# Patient Record
Sex: MALE | Race: BLACK OR AFRICAN AMERICAN | NOT HISPANIC OR LATINO | RURAL
[De-identification: names, ages, dates, MRNs, and addresses within clinical notes are randomized per-mention and may not be internally consistent; named-entity substitution may affect disease eponyms.]

---

## 2022-07-23 ENCOUNTER — HOSPITAL ENCOUNTER (EMERGENCY)
Facility: HOSPITAL | Age: 27
Discharge: HOME | End: 2022-07-23

## 2022-07-23 VITALS
SYSTOLIC BLOOD PRESSURE: 111 MMHG | TEMPERATURE: 98 F | WEIGHT: 170 LBS | OXYGEN SATURATION: 99 % | HEART RATE: 55 BPM | BODY MASS INDEX: 27.32 KG/M2 | HEIGHT: 66 IN | RESPIRATION RATE: 16 BRPM | DIASTOLIC BLOOD PRESSURE: 72 MMHG

## 2022-07-23 DIAGNOSIS — K52.9 GASTROENTERITIS: Primary | ICD-10-CM

## 2022-07-23 LAB
AMYLASE SERPL-CCNC: 52 U/L (ref 25–115)
ANION GAP SERPL CALCULATED.3IONS-SCNC: 18 MMOL/L (ref 7–16)
BASOPHILS # BLD AUTO: 0.02 K/UL (ref 0–0.2)
BASOPHILS NFR BLD AUTO: 0.4 % (ref 0–1)
BUN SERPL-MCNC: 16 MG/DL (ref 7–18)
BUN/CREAT SERPL: 16 (ref 6–20)
CALCIUM SERPL-MCNC: 9.3 MG/DL (ref 8.5–10.1)
CHLORIDE SERPL-SCNC: 102 MMOL/L (ref 98–107)
CO2 SERPL-SCNC: 27 MMOL/L (ref 21–32)
CREAT SERPL-MCNC: 0.97 MG/DL (ref 0.7–1.3)
DIFFERENTIAL METHOD BLD: ABNORMAL
EOSINOPHIL # BLD AUTO: 0.07 K/UL (ref 0–0.5)
EOSINOPHIL NFR BLD AUTO: 1.4 % (ref 1–4)
ERYTHROCYTE [DISTWIDTH] IN BLOOD BY AUTOMATED COUNT: 13.1 % (ref 11.5–14.5)
GLUCOSE SERPL-MCNC: 122 MG/DL (ref 74–106)
HCT VFR BLD AUTO: 49.1 % (ref 40–54)
HGB BLD-MCNC: 17.7 G/DL (ref 13.5–18)
LIPASE SERPL-CCNC: 60 U/L (ref 73–393)
LYMPHOCYTES # BLD AUTO: 1.26 K/UL (ref 1–4.8)
LYMPHOCYTES NFR BLD AUTO: 24.5 % (ref 27–41)
MCH RBC QN AUTO: 30.6 PG (ref 27–31)
MCHC RBC AUTO-ENTMCNC: 36 G/DL (ref 32–36)
MCV RBC AUTO: 84.9 FL (ref 80–96)
MONOCYTES # BLD AUTO: 0.33 K/UL (ref 0–0.8)
MONOCYTES NFR BLD AUTO: 6.4 % (ref 2–6)
MPC BLD CALC-MCNC: 11 FL (ref 9.4–12.4)
NEUTROPHILS # BLD AUTO: 3.46 K/UL (ref 1.8–7.7)
NEUTROPHILS NFR BLD AUTO: 67.3 % (ref 53–65)
PLATELET # BLD AUTO: 172 K/UL (ref 150–400)
POTASSIUM SERPL-SCNC: 3.6 MMOL/L (ref 3.5–5.1)
RBC # BLD AUTO: 5.78 M/UL (ref 4.6–6.2)
SODIUM SERPL-SCNC: 143 MMOL/L (ref 136–145)
WBC # BLD AUTO: 5.14 K/UL (ref 4.5–11)

## 2022-07-23 PROCEDURE — 25500020 PHARM REV CODE 255: Performed by: NURSE PRACTITIONER

## 2022-07-23 PROCEDURE — 82150 ASSAY OF AMYLASE: CPT | Performed by: NURSE PRACTITIONER

## 2022-07-23 PROCEDURE — 63600175 PHARM REV CODE 636 W HCPCS: Performed by: NURSE PRACTITIONER

## 2022-07-23 PROCEDURE — 80048 BASIC METABOLIC PNL TOTAL CA: CPT | Performed by: NURSE PRACTITIONER

## 2022-07-23 PROCEDURE — 99284 PR EMERGENCY DEPT VISIT,LEVEL IV: ICD-10-PCS | Mod: ,,, | Performed by: NURSE PRACTITIONER

## 2022-07-23 PROCEDURE — 99284 EMERGENCY DEPT VISIT MOD MDM: CPT | Mod: ,,, | Performed by: NURSE PRACTITIONER

## 2022-07-23 PROCEDURE — 36415 COLL VENOUS BLD VENIPUNCTURE: CPT | Performed by: NURSE PRACTITIONER

## 2022-07-23 PROCEDURE — 83690 ASSAY OF LIPASE: CPT | Performed by: NURSE PRACTITIONER

## 2022-07-23 PROCEDURE — 25000003 PHARM REV CODE 250: Performed by: NURSE PRACTITIONER

## 2022-07-23 PROCEDURE — 96375 TX/PRO/DX INJ NEW DRUG ADDON: CPT

## 2022-07-23 PROCEDURE — 85025 COMPLETE CBC W/AUTO DIFF WBC: CPT | Performed by: NURSE PRACTITIONER

## 2022-07-23 PROCEDURE — 99285 EMERGENCY DEPT VISIT HI MDM: CPT | Mod: 25

## 2022-07-23 PROCEDURE — 96365 THER/PROPH/DIAG IV INF INIT: CPT

## 2022-07-23 RX ORDER — PROMETHAZINE HYDROCHLORIDE 25 MG/1
25 TABLET ORAL EVERY 6 HOURS PRN
Qty: 15 TABLET | Refills: 0 | Status: SHIPPED | OUTPATIENT
Start: 2022-07-23

## 2022-07-23 RX ORDER — MORPHINE SULFATE 2 MG/ML
4 INJECTION, SOLUTION INTRAMUSCULAR; INTRAVENOUS
Status: COMPLETED | OUTPATIENT
Start: 2022-07-23 | End: 2022-07-23

## 2022-07-23 RX ORDER — ONDANSETRON 2 MG/ML
4 INJECTION INTRAMUSCULAR; INTRAVENOUS
Status: COMPLETED | OUTPATIENT
Start: 2022-07-23 | End: 2022-07-23

## 2022-07-23 RX ORDER — KETOROLAC TROMETHAMINE 30 MG/ML
30 INJECTION, SOLUTION INTRAMUSCULAR; INTRAVENOUS
Status: COMPLETED | OUTPATIENT
Start: 2022-07-23 | End: 2022-07-23

## 2022-07-23 RX ORDER — CIPROFLOXACIN 500 MG/1
500 TABLET ORAL 2 TIMES DAILY
Qty: 20 TABLET | Refills: 0 | Status: SHIPPED | OUTPATIENT
Start: 2022-07-23 | End: 2022-07-30

## 2022-07-23 RX ORDER — SODIUM CHLORIDE 9 MG/ML
1000 INJECTION, SOLUTION INTRAVENOUS
Status: COMPLETED | OUTPATIENT
Start: 2022-07-23 | End: 2022-07-23

## 2022-07-23 RX ORDER — METRONIDAZOLE 500 MG/1
500 TABLET ORAL 3 TIMES DAILY
Qty: 21 TABLET | Refills: 0 | Status: SHIPPED | OUTPATIENT
Start: 2022-07-23 | End: 2022-07-30

## 2022-07-23 RX ADMIN — MORPHINE SULFATE 4 MG: 2 INJECTION, SOLUTION INTRAMUSCULAR; INTRAVENOUS at 04:07

## 2022-07-23 RX ADMIN — IOPAMIDOL 100 ML: 755 INJECTION, SOLUTION INTRAVENOUS at 02:07

## 2022-07-23 RX ADMIN — MORPHINE SULFATE 4 MG: 2 INJECTION, SOLUTION INTRAMUSCULAR; INTRAVENOUS at 01:07

## 2022-07-23 RX ADMIN — SODIUM CHLORIDE 1000 ML: 9 INJECTION, SOLUTION INTRAVENOUS at 01:07

## 2022-07-23 RX ADMIN — PROMETHAZINE HYDROCHLORIDE 25 MG: 25 INJECTION INTRAMUSCULAR; INTRAVENOUS at 01:07

## 2022-07-23 RX ADMIN — ONDANSETRON 4 MG: 2 INJECTION INTRAMUSCULAR; INTRAVENOUS at 03:07

## 2022-07-23 RX ADMIN — KETOROLAC TROMETHAMINE 30 MG: 30 INJECTION, SOLUTION INTRAMUSCULAR at 03:07

## 2022-07-23 NOTE — ED TRIAGE NOTES
C/o n/v/abd pain since 0900 this am. States has vomited x 15 pta. Is heaving and vomiting yellow bile at triage. Can not be still. Denies any diarrhea and denies any other s/s.

## 2022-07-23 NOTE — DISCHARGE INSTRUCTIONS
Rest gut today. Slowly progress diet. Drink plenty of fluids. Take medication as prescribed. Follow up with PCP.

## 2022-07-23 NOTE — ED NOTES
Patient now states that he has had this issue several times before and is from [place] and has gone to several ER's and they cant tell him what is wrong they just give him pain meds, meds for n/v and send him home. This episode just started this am around 9. States ate [employer] last night but has not eaten today.

## 2022-07-23 NOTE — ED PROVIDER NOTES
Encounter Date: 7/23/2022       History     Chief Complaint   Patient presents with    Abdominal Pain    Vomiting     28 y/o BM presents to the ED with complaints of rapid onset of left lower quadrant pain with nausea and vomiting that began at 9 am. Pt reports he has vomited 15 times since 9am. Emesis is yellow and appears to be bile. Pt has no known PMH. Last ate last night. Last BM was this morning and he reports it was normal in amount, shape, color, and consistency.     Pt reports he has had this happen before. States he has been seen multiple times in hospitals in [place] and has been seen by GI but they could not find anything wrong. He states he does smoke marijuana and last used 2 days ago.         Review of patient's allergies indicates:  No Known Allergies  Past Medical History:   Diagnosis Date    Asthma      Past Surgical History:   Procedure Laterality Date    ADENOIDECTOMY      TONSILLECTOMY       History reviewed. No pertinent family history.  Social History     Tobacco Use    Smoking status: Current Every Day Smoker     Types: Cigarettes    Smokeless tobacco: Never Used   Substance Use Topics    Drug use: Yes     Types: Marijuana     Comment: 2 days ago     Review of Systems   Constitutional: Negative.    HENT: Negative.    Eyes: Negative.    Respiratory: Negative.  Negative for shortness of breath.    Cardiovascular: Negative.  Negative for chest pain, palpitations and leg swelling.   Gastrointestinal: Positive for abdominal pain, nausea and vomiting. Negative for diarrhea.   Endocrine: Negative.    Genitourinary: Negative.    Musculoskeletal: Negative.  Negative for arthralgias, back pain and neck pain.   Skin: Negative.    Neurological: Negative.    Psychiatric/Behavioral: Negative.    All other systems reviewed and are negative.      Physical Exam     Initial Vitals   BP Pulse Resp Temp SpO2   07/23/22 1336 07/23/22 1336 07/23/22 1336 07/23/22 1438 07/23/22 1336   (!) 122/94 62 20 97.8  °F (36.6 °C) 99 %      MAP       --                Physical Exam    Nursing note and vitals reviewed.  Constitutional: He appears well-developed and well-nourished.   HENT:   Head: Normocephalic and atraumatic.   Eyes: Conjunctivae and EOM are normal. Pupils are equal, round, and reactive to light.   Neck: Neck supple.   Normal range of motion.  Cardiovascular: Normal rate and normal heart sounds.   Pulmonary/Chest: Breath sounds normal.   Abdominal: Abdomen is soft. Bowel sounds are normal. There is abdominal tenderness (LLQ). There is guarding.   Musculoskeletal:         General: No tenderness or edema. Normal range of motion.      Cervical back: Normal range of motion and neck supple.     Neurological: He is alert and oriented to person, place, and time. He has normal strength.   Skin: Skin is warm and dry.   Psychiatric:   Pt appears to be in a great deal of pain. He is diaphoretic and moaning. Grimaces with any movement.          Medical Screening Exam   See Full Note    ED Course   Procedures  Labs Reviewed   BASIC METABOLIC PANEL - Abnormal; Notable for the following components:       Result Value    Anion Gap 18 (*)     Glucose 122 (*)     All other components within normal limits   CBC WITH DIFFERENTIAL - Abnormal; Notable for the following components:    Neutrophils % 67.3 (*)     Lymphocytes % 24.5 (*)     Monocytes % 6.4 (*)     All other components within normal limits   LIPASE - Abnormal; Notable for the following components:    Lipase 60 (*)     All other components within normal limits   AMYLASE - Normal   CBC W/ AUTO DIFFERENTIAL    Narrative:     The following orders were created for panel order CBC auto differential.  Procedure                               Abnormality         Status                     ---------                               -----------         ------                     CBC with Differential[[id number]]        Abnormal            Final result                 Please view results  for these tests on the individual orders.          Imaging Results          CT Abdomen Pelvis With Contrast (Final result)  Result time 07/23/22 15:53:00    Final result by Ernie Rizzo MD (07/23/22 15:53:00)                 Impression:      There is some mild nonspecific diffuse prominence of the wall of the majority of the colon, suggesting some nonspecific colitis.  No abscess.  No evidence of pneumoperitoneum.  No monet obstruction.    No definite acute process otherwise      Electronically signed by: Ernie Rizzo  Date:    07/23/2022  Time:    15:53             Narrative:    EXAMINATION:  CT ABDOMEN AND PELVIS with IV contrast    CLINICAL HISTORY:  Left lower quadrant abdominal pain    TECHNIQUE:  Axial CT images were obtained through the abdomen and pelvis following IV administration of 100 mL of Isovue 370 contrast. Oral contrast was not given.  Coronal and sagittal reconstructions submitted and interpreted.  Automated exposure control utilized.    COMPARISON:  No previous similar    FINDINGS:  CT abdomen:    Partially visualized lung bases are clear.  There is no gross pleural or pericardial effusion.    Liver, spleen, pancreas, bile ducts, adrenal glands, and kidneys are unremarkable.  There is bilateral renal excretion without hydronephrosis.    There is a small hiatal hernia.  Stomach is not well distended.    There is some mild diffuse prominence of the wall of the colon with some sparing at the sigmoid level.  Evaluation is difficult because of lack of colonic distention.    Appendix is identified and appears normal.    There is no monet bowel obstruction.    There is no abdominal aortic aneurysm.    CT pelvis:    Urinary bladder is moderately distended.  There is no soft tissue mass in the pelvis.    No acute osseous findings.                                 Medications   promethazine (PHENERGAN) 25 mg in dextrose 5 % 50 mL IVPB (0 mg Intravenous Stopped 7/23/22 1413)   0.9%  NaCl infusion  (0 mLs Intravenous Stopped 7/23/22 1416)   morphine injection 4 mg (4 mg Intravenous Given 7/23/22 1356)   iopamidoL (ISOVUE-370) injection 100 mL (100 mLs Intravenous Given 7/23/22 1458)   ondansetron injection 4 mg (4 mg Intravenous Given 7/23/22 1527)   ketorolac injection 30 mg (30 mg Intravenous Given 7/23/22 1544)   morphine injection 4 mg (4 mg Intravenous Given 7/23/22 1605)              ED Course as of 07/23/22 1630   Sat Jul 23, 2022   1443 Pt is no longer moaning. He is calm and appears to feel better. He is resting quietly.    [SK]   1543 Pt in pain again with nausea return. Zofran 4 mg IV given and Toradol 30 mg IV given.    [SK]   1624 Pt labs WNL. CT shows mild colitis. Will DC pt to home with instructions to rest his gut today and slowly progress his diet. Will give RX for Phenergan, Cipro, and Flagyl. Pt is to follow up with PCP.  [SK]   1627 Pt resting without pain. DC instructions given. Pt verbalized understanding. Reports he is leaving to go back home to [place] in the morning.  [SK]      ED Course User Index  [SK] AR Choi          Clinical Impression:   Final diagnoses:  [K52.9] Gastroenteritis (Primary)          ED Disposition Condition    Discharge Stable        ED Prescriptions     Medication Sig Dispense Start Date End Date Auth. Provider    ciprofloxacin HCl (CIPRO) 500 MG tablet Take 1 tablet (500 mg total) by mouth 2 (two) times daily. for 7 days 20 tablet 7/23/2022 7/30/2022 AR Choi    metroNIDAZOLE (FLAGYL) 500 MG tablet Take 1 tablet (500 mg total) by mouth 3 (three) times daily. for 7 days 21 tablet 7/23/2022 7/30/2022 AR Choi    promethazine (PHENERGAN) 25 MG tablet Take 1 tablet (25 mg total) by mouth every 6 (six) hours as needed for Nausea. 15 tablet 7/23/2022  AR Choi        Follow-up Information    None          AR Choi  07/23/22 1627       AR Choi  07/23/22 1630

## 2022-07-24 ENCOUNTER — HOSPITAL ENCOUNTER (EMERGENCY)
Facility: HOSPITAL | Age: 27
Discharge: HOME | End: 2022-07-24
Attending: EMERGENCY MEDICINE

## 2022-07-24 VITALS
TEMPERATURE: 99 F | BODY MASS INDEX: 26.79 KG/M2 | DIASTOLIC BLOOD PRESSURE: 64 MMHG | OXYGEN SATURATION: 100 % | WEIGHT: 166 LBS | HEART RATE: 60 BPM | RESPIRATION RATE: 23 BRPM | SYSTOLIC BLOOD PRESSURE: 115 MMHG

## 2022-07-24 DIAGNOSIS — R10.30 LOWER ABDOMINAL PAIN: Primary | ICD-10-CM

## 2022-07-24 PROCEDURE — 99283 EMERGENCY DEPT VISIT LOW MDM: CPT | Mod: ,,, | Performed by: EMERGENCY MEDICINE

## 2022-07-24 PROCEDURE — 99283 EMERGENCY DEPT VISIT LOW MDM: CPT

## 2022-07-24 PROCEDURE — 99283 PR EMERGENCY DEPT VISIT,LEVEL III: ICD-10-PCS | Mod: ,,, | Performed by: EMERGENCY MEDICINE

## 2022-07-24 NOTE — ED PROVIDER NOTES
Encounter Date: 7/24/2022       History     Chief Complaint   Patient presents with    Abdominal Pain     X 2 days - ems called - seen at [facility] yesterday - rx given -      Vomiting    Weakness    Nausea     A 27-year-old male patient came to the emergency department complaining of lower abdominal pain on the left side.  The patient has been evaluated by another ER less than 24 hours ago and had a CT scan of the abdomen and pelvis with IV contrast which revealed thickening of the colon consistent with colitis.  The patient states that he had multiple episodes of this pain and he was evaluated by Gastroenterology previously for without finding a reason for his abdominal pain.  There is no fever or chills.  There is no nausea or vomiting.  The pain mainly on the left side not associated with the problem with urination but associated with vomiting.  The patient admits to smoking marijuana in the past 2 days.    The history is provided by the patient. No  was used.     Review of patient's allergies indicates:  No Known Allergies  Past Medical History:   Diagnosis Date    Asthma      Past Surgical History:   Procedure Laterality Date    ADENOIDECTOMY      TONSILLECTOMY       History reviewed. No pertinent family history.  Social History     Tobacco Use    Smoking status: Current Every Day Smoker     Types: Cigarettes    Smokeless tobacco: Never Used   Substance Use Topics    Drug use: Yes     Types: Marijuana     Comment: 2 days ago     Review of Systems   Constitutional: Negative for fever.   HENT: Negative for sore throat.    Respiratory: Negative for shortness of breath.    Cardiovascular: Negative for chest pain.   Gastrointestinal: Positive for abdominal pain. Negative for nausea.   Genitourinary: Negative for dysuria.   Musculoskeletal: Negative for back pain.   Skin: Negative for rash.   Neurological: Negative for weakness.   Hematological: Does not bruise/bleed easily.        Physical Exam     Initial Vitals [07/24/22 0211]   BP Pulse Resp Temp SpO2   132/76 78 18 98.8 °F (37.1 °C) 98 %      MAP       --         Physical Exam    Nursing note and vitals reviewed.  Constitutional: He appears well-developed and well-nourished.   HENT:   Head: Normocephalic and atraumatic.   Eyes: EOM are normal. Pupils are equal, round, and reactive to light.   Neck: Neck supple. No thyromegaly present.   Normal range of motion.  Cardiovascular: Normal rate, regular rhythm, normal heart sounds and intact distal pulses.   No murmur heard.  Pulmonary/Chest: Breath sounds normal. No respiratory distress. He has no wheezes.   Abdominal: Abdomen is soft. Bowel sounds are normal. There is no abdominal tenderness.   Musculoskeletal:         General: No tenderness or edema. Normal range of motion.      Cervical back: Normal range of motion and neck supple.     Lymphadenopathy:     He has no cervical adenopathy.   Neurological: He is alert and oriented to person, place, and time. He has normal strength and normal reflexes. No cranial nerve deficit or sensory deficit. GCS score is 15. GCS eye subscore is 4. GCS verbal subscore is 5. GCS motor subscore is 6.   Skin: Skin is warm and dry. Capillary refill takes less than 2 seconds. No rash noted.   Psychiatric: He has a normal mood and affect.         Medical Screening Exam   See Full Note    ED Course   Procedures  Labs Reviewed - No data to display       Imaging Results    None          Medications - No data to display  Medical Decision Making:   ED Management:  During the course of evaluating the patient and waiting for lab results the patient became impatient , very agitated and aggressive.  He left against medical advice.                   Clinical Impression:   Final diagnoses:  [R10.30] Lower abdominal pain (Primary)          ED Disposition Condition    ARTEMIO Kirk MD  07/24/22 1916